# Patient Record
Sex: FEMALE | Race: WHITE | Employment: UNEMPLOYED | URBAN - NONMETROPOLITAN AREA
[De-identification: names, ages, dates, MRNs, and addresses within clinical notes are randomized per-mention and may not be internally consistent; named-entity substitution may affect disease eponyms.]

---

## 2017-02-28 ENCOUNTER — OFFICE VISIT (OUTPATIENT)
Dept: FAMILY MEDICINE CLINIC | Age: 57
End: 2017-02-28

## 2017-02-28 VITALS
WEIGHT: 137.8 LBS | BODY MASS INDEX: 27.78 KG/M2 | SYSTOLIC BLOOD PRESSURE: 100 MMHG | DIASTOLIC BLOOD PRESSURE: 68 MMHG | RESPIRATION RATE: 12 BRPM | HEIGHT: 59 IN | HEART RATE: 96 BPM

## 2017-02-28 DIAGNOSIS — Z82.62: ICD-10-CM

## 2017-02-28 DIAGNOSIS — M79.10 MUSCLE SORENESS: ICD-10-CM

## 2017-02-28 DIAGNOSIS — R20.0 FACIAL NUMBNESS: ICD-10-CM

## 2017-02-28 DIAGNOSIS — M40.292 OTHER KYPHOSIS OF CERVICAL REGION: ICD-10-CM

## 2017-02-28 DIAGNOSIS — R20.0 ARM NUMBNESS LEFT: Primary | ICD-10-CM

## 2017-02-28 DIAGNOSIS — Z78.0 POSTMENOPAUSAL: ICD-10-CM

## 2017-02-28 PROCEDURE — 3017F COLORECTAL CA SCREEN DOC REV: CPT | Performed by: FAMILY MEDICINE

## 2017-02-28 PROCEDURE — 99213 OFFICE O/P EST LOW 20 MIN: CPT | Performed by: FAMILY MEDICINE

## 2017-02-28 PROCEDURE — 1036F TOBACCO NON-USER: CPT | Performed by: FAMILY MEDICINE

## 2017-02-28 PROCEDURE — G8427 DOCREV CUR MEDS BY ELIG CLIN: HCPCS | Performed by: FAMILY MEDICINE

## 2017-02-28 PROCEDURE — G8419 CALC BMI OUT NRM PARAM NOF/U: HCPCS | Performed by: FAMILY MEDICINE

## 2017-02-28 PROCEDURE — 3014F SCREEN MAMMO DOC REV: CPT | Performed by: FAMILY MEDICINE

## 2017-02-28 PROCEDURE — G8484 FLU IMMUNIZE NO ADMIN: HCPCS | Performed by: FAMILY MEDICINE

## 2017-02-28 RX ORDER — MAGNESIUM 30 MG
30 TABLET ORAL 2 TIMES DAILY
COMMUNITY

## 2017-02-28 ASSESSMENT — PATIENT HEALTH QUESTIONNAIRE - PHQ9
SUM OF ALL RESPONSES TO PHQ QUESTIONS 1-9: 0
2. FEELING DOWN, DEPRESSED OR HOPELESS: 0
1. LITTLE INTEREST OR PLEASURE IN DOING THINGS: 0
SUM OF ALL RESPONSES TO PHQ9 QUESTIONS 1 & 2: 0

## 2017-03-01 ASSESSMENT — ENCOUNTER SYMPTOMS
DIARRHEA: 0
BLOOD IN STOOL: 0
NAUSEA: 0
BACK PAIN: 1
STRIDOR: 0
WHEEZING: 0
COUGH: 0
VOMITING: 0
ABDOMINAL PAIN: 0
SHORTNESS OF BREATH: 0
CONSTIPATION: 0

## 2017-03-03 ENCOUNTER — TELEPHONE (OUTPATIENT)
Dept: FAMILY MEDICINE CLINIC | Age: 57
End: 2017-03-03

## 2017-03-03 DIAGNOSIS — M81.0 OSTEOPOROSIS: Primary | ICD-10-CM

## 2017-03-03 RX ORDER — ALENDRONATE SODIUM 70 MG/1
70 TABLET ORAL
Qty: 4 TABLET | Refills: 11 | Status: SHIPPED | OUTPATIENT
Start: 2017-03-03 | End: 2019-03-04 | Stop reason: ALTCHOICE

## 2017-08-07 ENCOUNTER — HOSPITAL ENCOUNTER (OUTPATIENT)
Dept: MAMMOGRAPHY | Age: 57
Discharge: HOME | End: 2017-08-07
Payer: COMMERCIAL

## 2017-08-07 DIAGNOSIS — Z12.39 SCREENING BREAST EXAMINATION: ICD-10-CM

## 2017-08-07 PROCEDURE — G0202 SCR MAMMO BI INCL CAD: HCPCS

## 2018-03-01 ENCOUNTER — OFFICE VISIT (OUTPATIENT)
Dept: FAMILY MEDICINE CLINIC | Age: 58
End: 2018-03-01
Payer: COMMERCIAL

## 2018-03-01 VITALS
WEIGHT: 132.8 LBS | BODY MASS INDEX: 26.77 KG/M2 | SYSTOLIC BLOOD PRESSURE: 110 MMHG | DIASTOLIC BLOOD PRESSURE: 68 MMHG | HEART RATE: 88 BPM | HEIGHT: 59 IN | RESPIRATION RATE: 12 BRPM

## 2018-03-01 DIAGNOSIS — Z00.00 ROUTINE HEALTH MAINTENANCE: Primary | ICD-10-CM

## 2018-03-01 PROCEDURE — 99396 PREV VISIT EST AGE 40-64: CPT | Performed by: FAMILY MEDICINE

## 2018-03-01 ASSESSMENT — PATIENT HEALTH QUESTIONNAIRE - PHQ9
2. FEELING DOWN, DEPRESSED OR HOPELESS: 0
SUM OF ALL RESPONSES TO PHQ QUESTIONS 1-9: 0
SUM OF ALL RESPONSES TO PHQ9 QUESTIONS 1 & 2: 0
1. LITTLE INTEREST OR PLEASURE IN DOING THINGS: 0

## 2018-03-01 NOTE — PATIENT INSTRUCTIONS
risk for heart attack and stroke. · Blood pressure. Have your blood pressure checked during a routine doctor visit. Your doctor will tell you how often to check your blood pressure based on your age, your blood pressure results, and other factors. · Mammogram. Ask your doctor how often you should have a mammogram, which is an X-ray of your breasts. A mammogram can spot breast cancer before it can be felt and when it is easiest to treat. · Pap test and pelvic exam. Ask your doctor how often you should have a Pap test. You may not need to have a Pap test as often as you used to. · Vision. Have your eyes checked every year or two or as often as your doctor suggests. Some experts recommend that you have yearly exams for glaucoma and other age-related eye problems starting at age 48. · Hearing. Tell your doctor if you notice any change in your hearing. You can have tests to find out how well you hear. · Diabetes. Ask your doctor whether you should have tests for diabetes. · Colon cancer. You should begin tests for colon cancer at age 48. You may have one of several tests. Your doctor will tell you how often to have tests based on your age and risk. Risks include whether you already had a precancerous polyp removed from your colon or whether your parents, sisters and brothers, or children have had colon cancer. · Thyroid disease. Talk to your doctor about whether to have your thyroid checked as part of a regular physical exam. Women have an increased chance of a thyroid problem. · Osteoporosis. You should begin tests for bone density at age 72. If you are younger than 72, ask your doctor whether you have factors that may increase your risk for this disease. You may want to have this test before age 72. · Heart attack and stroke risk. At least every 4 to 6 years, you should have your risk for heart attack and stroke assessed.  Your doctor uses factors such as your age, blood pressure, cholesterol, and whether you jogging, stair climbing, and dancing are good choices. ¨ Do resistance exercises with weights or elastic bands 2 to 3 days a week. · Limit alcohol. Drink no more than 1 alcohol drink a day if you are a woman. Drink no more than 2 alcohol drinks a day if you are a man. · Do not smoke. Smoking can make bones thin faster. If you need help quitting, talk to your doctor about stop-smoking programs and medicines. These can increase your chances of quitting for good. When should you call for help? Watch closely for changes in your health, and be sure to contact your doctor if you have any problems. Where can you learn more? Go to https://[facility].Epoque. org and sign in to your [employer] account. Enter O028 in the WideOrbit box to learn more about \"Preventing Osteoporosis: Care Instructions. \"     If you do not have an account, please click on the \"Sign Up Now\" link. Current as of: May 12, 2017  Content Version: 11.5  © 9971-2551 Healthwise, Incorporated. Care instructions adapted under license by [facility] ([facility]). If you have questions about a medical condition or this instruction, always ask your healthcare professional. [address] any warranty or liability for your use of this information.

## 2018-03-01 NOTE — PROGRESS NOTES
Cherene Shone is a 61 y.o. female who presents today for:   Chief Complaint   Patient presents with    Annual Exam         HPI:     HPI   Yearly visit   Feeling well with no complaints  No vag bleeding/discharge    Still with occ numbness still in L arm. Worse when  neck out of place. Chiropractor and yoga has helped. occ GERD, worse with acidic foods. Resolves with eating apple and doesn't take meds for it. Walking more at local community center more recently. Due for colonoscopy-- [facility] number and she will call to set up an appt    Scheduled already for pap- Dr. Wilma Locke this year. Patient's medications, allergies, past medical, surgical, social, and family histories were reviewed and updated as appropriate. Outpatient Medications Prior to Visit   Medication Sig Dispense Refill    Multiple Vitamins-Minerals (ICAPS AREDS 2) CAPS Take by mouth      alendronate (FOSAMAX) 70 MG tablet Take 1 tablet by mouth every 7 days For osteoporosis 4 tablet 11    Cranberry 1000 MG CAPS Take by mouth      magnesium 30 MG tablet Take 30 mg by mouth 2 times daily       No facility-administered medications prior to visit. Subjective:      Review of Systems   Constitutional: Negative for activity change, appetite change, chills, diaphoresis, fatigue, fever and unexpected weight change. Respiratory: Negative for cough, shortness of breath, wheezing and stridor. Cardiovascular: Negative for chest pain, palpitations and leg swelling. Gastrointestinal: Negative for abdominal pain, blood in stool, constipation, diarrhea, nausea and vomiting. Neurological: Negative for headaches.        Objective:     Vitals:    03/01/18 1303   BP: 110/68   Site: Left Arm   Position: Sitting   Cuff Size: Medium Adult   Pulse: 88   Resp: 12   Weight: 132 lb 12.8 oz (60.2 kg)   Height: 4' 11.06\" (1.5 m)     Wt Readings from Last 3 Encounters:   03/01/18 132 lb 12.8 oz (60.2 kg)   02/28/17 137 lb

## 2018-03-02 ASSESSMENT — ENCOUNTER SYMPTOMS
ABDOMINAL PAIN: 0
DIARRHEA: 0
VOMITING: 0
STRIDOR: 0
CONSTIPATION: 0
COUGH: 0
SHORTNESS OF BREATH: 0
WHEEZING: 0
NAUSEA: 0
BLOOD IN STOOL: 0

## 2018-05-12 LAB
ALBUMIN SERPL-MCNC: 4.2 G/DL
ALP BLD-CCNC: 65 U/L
ALT SERPL-CCNC: 14 U/L
ANION GAP SERPL CALCULATED.3IONS-SCNC: 1.4 MMOL/L
AST SERPL-CCNC: 13 U/L
BASOPHILS ABSOLUTE: 0.1 /ΜL
BASOPHILS RELATIVE PERCENT: 0.7 %
BILIRUB SERPL-MCNC: 0.5 MG/DL (ref 0.1–1.4)
BUN BLDV-MCNC: 14 MG/DL
CALCIUM SERPL-MCNC: 9.1 MG/DL
CHLORIDE BLD-SCNC: 100 MMOL/L
CHOLESTEROL, TOTAL: 240 MG/DL
CHOLESTEROL/HDL RATIO: 2.7
CO2: 29 MMOL/L
CREAT SERPL-MCNC: 0.8 MG/DL
EOSINOPHILS ABSOLUTE: 0.1 /ΜL
EOSINOPHILS RELATIVE PERCENT: 1.7 %
GFR CALCULATED: 74
GLUCOSE BLD-MCNC: 76 MG/DL
HCT VFR BLD CALC: 42.1 % (ref 36–46)
HDLC SERPL-MCNC: 60 MG/DL (ref 35–70)
HEMOGLOBIN: 14.3 G/DL (ref 12–16)
LDL CHOLESTEROL CALCULATED: 164 MG/DL (ref 0–160)
LYMPHOCYTES ABSOLUTE: 2.2 /ΜL
LYMPHOCYTES RELATIVE PERCENT: 58 %
MCH RBC QN AUTO: 33.2 PG
MCHC RBC AUTO-ENTMCNC: 33.9 G/DL
MCV RBC AUTO: 97.9 FL
MONOCYTES ABSOLUTE: 0.5 /ΜL
MONOCYTES RELATIVE PERCENT: 12.6 %
NEUTROPHILS ABSOLUTE: 1 /ΜL
NEUTROPHILS RELATIVE PERCENT: 27 %
PDW BLD-RTO: 13.3 %
PHOSPHORUS: 3.7 MG/DL
PLATELET # BLD: 294 K/ΜL
PMV BLD AUTO: 10.3 FL
POTASSIUM SERPL-SCNC: 4.3 MMOL/L
RBC # BLD: 4.3 10^6/ΜL
SODIUM BLD-SCNC: 137 MMOL/L
TOTAL PROTEIN: 7.1
TRIGL SERPL-MCNC: 80 MG/DL
VLDLC SERPL CALC-MCNC: 16 MG/DL
WBC # BLD: 3.8 10^3/ML

## 2018-08-10 ENCOUNTER — HOSPITAL ENCOUNTER (OUTPATIENT)
Dept: MAMMOGRAPHY | Age: 58
Discharge: HOME | End: 2018-08-10
Payer: COMMERCIAL

## 2018-08-10 DIAGNOSIS — Z12.39 BREAST SCREENING: ICD-10-CM

## 2018-08-10 PROCEDURE — 77067 SCR MAMMO BI INCL CAD: CPT

## 2019-03-03 ENCOUNTER — TELEPHONE (OUTPATIENT)
Dept: FAMILY MEDICINE CLINIC | Age: 59
End: 2019-03-03

## 2019-03-04 ENCOUNTER — OFFICE VISIT (OUTPATIENT)
Dept: FAMILY MEDICINE CLINIC | Age: 59
End: 2019-03-04
Payer: COMMERCIAL

## 2019-03-04 VITALS
BODY MASS INDEX: 26.17 KG/M2 | TEMPERATURE: 96.7 F | HEART RATE: 84 BPM | HEIGHT: 59 IN | SYSTOLIC BLOOD PRESSURE: 118 MMHG | RESPIRATION RATE: 12 BRPM | DIASTOLIC BLOOD PRESSURE: 74 MMHG | WEIGHT: 129.8 LBS

## 2019-03-04 DIAGNOSIS — M40.292 OTHER KYPHOSIS OF CERVICAL REGION: ICD-10-CM

## 2019-03-04 DIAGNOSIS — M81.0 AGE-RELATED OSTEOPOROSIS WITHOUT CURRENT PATHOLOGICAL FRACTURE: ICD-10-CM

## 2019-03-04 DIAGNOSIS — S93.492A SPRAIN OF POSTERIOR TALOFIBULAR LIGAMENT OF LEFT ANKLE, INITIAL ENCOUNTER: ICD-10-CM

## 2019-03-04 DIAGNOSIS — Z78.0 POST-MENOPAUSAL: ICD-10-CM

## 2019-03-04 DIAGNOSIS — R14.0 GASSINESS: ICD-10-CM

## 2019-03-04 DIAGNOSIS — Z00.00 ROUTINE HEALTH MAINTENANCE: Primary | ICD-10-CM

## 2019-03-04 PROCEDURE — 1036F TOBACCO NON-USER: CPT | Performed by: FAMILY MEDICINE

## 2019-03-04 PROCEDURE — 3017F COLORECTAL CA SCREEN DOC REV: CPT | Performed by: FAMILY MEDICINE

## 2019-03-04 PROCEDURE — 99396 PREV VISIT EST AGE 40-64: CPT | Performed by: FAMILY MEDICINE

## 2019-03-04 PROCEDURE — G8484 FLU IMMUNIZE NO ADMIN: HCPCS | Performed by: FAMILY MEDICINE

## 2019-03-04 PROCEDURE — G8427 DOCREV CUR MEDS BY ELIG CLIN: HCPCS | Performed by: FAMILY MEDICINE

## 2019-03-04 PROCEDURE — G8419 CALC BMI OUT NRM PARAM NOF/U: HCPCS | Performed by: FAMILY MEDICINE

## 2019-03-04 PROCEDURE — 99212 OFFICE O/P EST SF 10 MIN: CPT | Performed by: FAMILY MEDICINE

## 2019-03-04 ASSESSMENT — PATIENT HEALTH QUESTIONNAIRE - PHQ9
2. FEELING DOWN, DEPRESSED OR HOPELESS: 0
SUM OF ALL RESPONSES TO PHQ9 QUESTIONS 1 & 2: 0
1. LITTLE INTEREST OR PLEASURE IN DOING THINGS: 0
SUM OF ALL RESPONSES TO PHQ QUESTIONS 1-9: 0
SUM OF ALL RESPONSES TO PHQ QUESTIONS 1-9: 0

## 2019-03-04 ASSESSMENT — ENCOUNTER SYMPTOMS
BLOOD IN STOOL: 0
COUGH: 0
VOMITING: 0
NAUSEA: 0
WHEEZING: 0
ABDOMINAL PAIN: 0
STRIDOR: 0
DIARRHEA: 0
SHORTNESS OF BREATH: 0
CONSTIPATION: 0

## 2019-03-06 ENCOUNTER — HOSPITAL ENCOUNTER (OUTPATIENT)
Dept: WOMENS IMAGING | Age: 59
Discharge: HOME | End: 2019-03-06
Payer: COMMERCIAL

## 2019-03-06 ENCOUNTER — TELEPHONE (OUTPATIENT)
Dept: FAMILY MEDICINE CLINIC | Age: 59
End: 2019-03-06

## 2019-03-06 DIAGNOSIS — Z78.0 POST-MENOPAUSAL: Primary | ICD-10-CM

## 2019-03-06 DIAGNOSIS — M40.292 OTHER KYPHOSIS OF CERVICAL REGION: ICD-10-CM

## 2019-03-06 DIAGNOSIS — M81.0 AGE-RELATED OSTEOPOROSIS WITHOUT CURRENT PATHOLOGICAL FRACTURE: ICD-10-CM

## 2019-03-06 DIAGNOSIS — M81.0 OSTEOPOROSIS: ICD-10-CM

## 2019-03-06 DIAGNOSIS — Z78.0 POST-MENOPAUSAL: ICD-10-CM

## 2019-03-06 PROCEDURE — 77080 DXA BONE DENSITY AXIAL: CPT

## 2019-03-07 RX ORDER — ALENDRONATE SODIUM 70 MG/1
70 TABLET ORAL
Qty: 12 TABLET | Refills: 3 | Status: SHIPPED | OUTPATIENT
Start: 2019-03-07

## 2019-05-03 ENCOUNTER — NURSE ONLY (OUTPATIENT)
Dept: LAB | Age: 59
End: 2019-05-03

## 2019-05-03 LAB
ALT SERPL-CCNC: 21 U/L (ref 11–66)
ANION GAP SERPL CALCULATED.3IONS-SCNC: 10 MEQ/L (ref 8–16)
AST SERPL-CCNC: 19 U/L (ref 5–40)
BUN BLDV-MCNC: 14 MG/DL (ref 7–22)
CALCIUM SERPL-MCNC: 9.2 MG/DL (ref 8.5–10.5)
CHLORIDE BLD-SCNC: 102 MEQ/L (ref 98–111)
CHOLESTEROL, TOTAL: 220 MG/DL (ref 100–199)
CO2: 28 MEQ/L (ref 23–33)
CREAT SERPL-MCNC: 0.7 MG/DL (ref 0.4–1.2)
ERYTHROCYTE [DISTWIDTH] IN BLOOD BY AUTOMATED COUNT: 13.4 % (ref 11.5–14.5)
ERYTHROCYTE [DISTWIDTH] IN BLOOD BY AUTOMATED COUNT: 49.1 FL (ref 35–45)
GFR SERPL CREATININE-BSD FRML MDRD: 86 ML/MIN/1.73M2
GLUCOSE BLD-MCNC: 85 MG/DL (ref 70–108)
HCT VFR BLD CALC: 42.3 % (ref 37–47)
HDLC SERPL-MCNC: 58 MG/DL
HEMOGLOBIN: 13.9 GM/DL (ref 12–16)
LDL CHOLESTEROL CALCULATED: 148 MG/DL
MCH RBC QN AUTO: 32.9 PG (ref 26–33)
MCHC RBC AUTO-ENTMCNC: 32.9 GM/DL (ref 32.2–35.5)
MCV RBC AUTO: 100.2 FL (ref 81–99)
PLATELET # BLD: 291 THOU/MM3 (ref 130–400)
PMV BLD AUTO: 11.7 FL (ref 9.4–12.4)
POTASSIUM SERPL-SCNC: 4.5 MEQ/L (ref 3.5–5.2)
RBC # BLD: 4.22 MILL/MM3 (ref 4.2–5.4)
SODIUM BLD-SCNC: 140 MEQ/L (ref 135–145)
TRIGL SERPL-MCNC: 72 MG/DL (ref 0–199)
WBC # BLD: 4 THOU/MM3 (ref 4.8–10.8)

## 2019-08-12 ENCOUNTER — HOSPITAL ENCOUNTER (OUTPATIENT)
Dept: MAMMOGRAPHY | Age: 59
Discharge: HOME | End: 2019-08-12
Payer: COMMERCIAL

## 2019-08-12 DIAGNOSIS — Z12.39 SCREENING BREAST EXAMINATION: ICD-10-CM

## 2019-08-12 PROCEDURE — 77063 BREAST TOMOSYNTHESIS BI: CPT

## 2019-10-12 LAB
AVERAGE GLUCOSE: 103
HBA1C MFR BLD: 5.2 %

## 2020-09-17 ENCOUNTER — HOSPITAL ENCOUNTER (OUTPATIENT)
Dept: MAMMOGRAPHY | Age: 60
Discharge: HOME | End: 2020-09-17
Payer: COMMERCIAL

## 2020-09-17 PROCEDURE — 77063 BREAST TOMOSYNTHESIS BI: CPT

## 2021-09-15 ENCOUNTER — NURSE ONLY (OUTPATIENT)
Dept: LAB | Age: 61
End: 2021-09-15

## 2021-09-16 ENCOUNTER — TELEPHONE (OUTPATIENT)
Dept: FAMILY MEDICINE CLINIC | Age: 61
End: 2021-09-16

## 2021-09-16 NOTE — TELEPHONE ENCOUNTER
----- Message from Blanquita Beltran sent at 9/16/2021  1:09 PM EDT -----  Subject: Message to Provider    QUESTIONS  Information for Provider? Patient wondering who is taking her on as a new   patient in the office now due to PCP leaving; wanted to know who to send   blood work orders as well. Also wanted information for daughter, who was   also a patient, should go to to note about wisdom teeth coming soon. If   the office would like, a text would be fine as well.   ---------------------------------------------------------------------------  --------------  CALL BACK INFO  What is the best way for the office to contact you? OK to leave message on   voicemail  Preferred Call Back Phone Number? [phone]  ---------------------------------------------------------------------------  --------------  SCRIPT ANSWERS  Relationship to Patient?  Self

## 2021-09-27 LAB — CYTOLOGY THIN PREP PAP: NORMAL

## 2021-10-05 ENCOUNTER — HOSPITAL ENCOUNTER (OUTPATIENT)
Dept: MAMMOGRAPHY | Age: 61
Discharge: HOME | End: 2021-10-05
Payer: COMMERCIAL

## 2021-10-05 DIAGNOSIS — Z12.39 SCREENING BREAST EXAMINATION: ICD-10-CM

## 2021-10-05 PROCEDURE — 77063 BREAST TOMOSYNTHESIS BI: CPT

## 2021-10-09 LAB
ALBUMIN SERPL-MCNC: 4.4 G/DL
ALP BLD-CCNC: 63 U/L
ALT SERPL-CCNC: 12 U/L
ANION GAP SERPL CALCULATED.3IONS-SCNC: NORMAL MMOL/L
AST SERPL-CCNC: 15 U/L
BASOPHILS ABSOLUTE: NORMAL
BASOPHILS RELATIVE PERCENT: 1.1 %
BILIRUB SERPL-MCNC: 0.4 MG/DL (ref 0.1–1.4)
BUN BLDV-MCNC: 19 MG/DL
CALCIUM SERPL-MCNC: 9.2 MG/DL
CHLORIDE BLD-SCNC: 101 MMOL/L
CHOLESTEROL, TOTAL: 239 MG/DL
CHOLESTEROL/HDL RATIO: 2.4
CO2: 29 MMOL/L
CREAT SERPL-MCNC: 0.8 MG/DL
EOSINOPHILS ABSOLUTE: 0.1 /ΜL
EOSINOPHILS RELATIVE PERCENT: 3 %
GFR CALCULATED: 73
GLUCOSE BLD-MCNC: 73 MG/DL
HCT VFR BLD CALC: 42 % (ref 36–46)
HDLC SERPL-MCNC: 67 MG/DL (ref 35–70)
HEMOGLOBIN: 13.8 G/DL (ref 12–16)
LDL CHOLESTEROL CALCULATED: 160 MG/DL (ref 0–160)
LYMPHOCYTES ABSOLUTE: 1.9 /ΜL
LYMPHOCYTES RELATIVE PERCENT: 46.9 %
MCH RBC QN AUTO: 32.7 PG
MCHC RBC AUTO-ENTMCNC: 32.9 G/DL
MCV RBC AUTO: 99.4 FL
MONOCYTES ABSOLUTE: 1.5 /ΜL
MONOCYTES RELATIVE PERCENT: 12.7 %
NEUTROPHILS ABSOLUTE: 1.5 /ΜL
NEUTROPHILS RELATIVE PERCENT: 36.3 %
NONHDLC SERPL-MCNC: NORMAL MG/DL
PDW BLD-RTO: 13.8 %
PLATELET # BLD: 252 K/ΜL
PMV BLD AUTO: 10.1 FL
POTASSIUM SERPL-SCNC: 4.1 MMOL/L
RBC # BLD: 4.22 10^6/ΜL
SODIUM BLD-SCNC: 137 MMOL/L
TOTAL PROTEIN: 6.9
TRIGL SERPL-MCNC: 58 MG/DL
VLDLC SERPL CALC-MCNC: 12 MG/DL
WBC # BLD: 4.1 10^3/ML

## 2022-01-31 ENCOUNTER — OFFICE VISIT (OUTPATIENT)
Dept: FAMILY MEDICINE CLINIC | Age: 62
End: 2022-01-31
Payer: COMMERCIAL

## 2022-01-31 VITALS
BODY MASS INDEX: 23.18 KG/M2 | HEART RATE: 89 BPM | WEIGHT: 115 LBS | RESPIRATION RATE: 14 BRPM | SYSTOLIC BLOOD PRESSURE: 118 MMHG | TEMPERATURE: 97.6 F | DIASTOLIC BLOOD PRESSURE: 72 MMHG

## 2022-01-31 DIAGNOSIS — H01.006 BLEPHARITIS OF BOTH EYES, UNSPECIFIED EYELID, UNSPECIFIED TYPE: Primary | ICD-10-CM

## 2022-01-31 DIAGNOSIS — H01.003 BLEPHARITIS OF BOTH EYES, UNSPECIFIED EYELID, UNSPECIFIED TYPE: Primary | ICD-10-CM

## 2022-01-31 PROCEDURE — 99213 OFFICE O/P EST LOW 20 MIN: CPT | Performed by: NURSE PRACTITIONER

## 2022-01-31 PROCEDURE — 96372 THER/PROPH/DIAG INJ SC/IM: CPT | Performed by: NURSE PRACTITIONER

## 2022-01-31 RX ORDER — TRIAMCINOLONE ACETONIDE 40 MG/ML
40 INJECTION, SUSPENSION INTRA-ARTICULAR; INTRAMUSCULAR ONCE
Status: COMPLETED | OUTPATIENT
Start: 2022-01-31 | End: 2022-01-31

## 2022-01-31 RX ADMIN — TRIAMCINOLONE ACETONIDE 40 MG: 40 INJECTION, SUSPENSION INTRA-ARTICULAR; INTRAMUSCULAR at 11:37

## 2022-01-31 SDOH — ECONOMIC STABILITY: FOOD INSECURITY: WITHIN THE PAST 12 MONTHS, THE FOOD YOU BOUGHT JUST DIDN'T LAST AND YOU DIDN'T HAVE MONEY TO GET MORE.: NEVER TRUE

## 2022-01-31 SDOH — ECONOMIC STABILITY: FOOD INSECURITY: WITHIN THE PAST 12 MONTHS, YOU WORRIED THAT YOUR FOOD WOULD RUN OUT BEFORE YOU GOT MONEY TO BUY MORE.: NEVER TRUE

## 2022-01-31 ASSESSMENT — SOCIAL DETERMINANTS OF HEALTH (SDOH): HOW HARD IS IT FOR YOU TO PAY FOR THE VERY BASICS LIKE FOOD, HOUSING, MEDICAL CARE, AND HEATING?: NOT HARD AT ALL

## 2022-01-31 ASSESSMENT — PATIENT HEALTH QUESTIONNAIRE - PHQ9
2. FEELING DOWN, DEPRESSED OR HOPELESS: 0
SUM OF ALL RESPONSES TO PHQ QUESTIONS 1-9: 0
1. LITTLE INTEREST OR PLEASURE IN DOING THINGS: 0
SUM OF ALL RESPONSES TO PHQ9 QUESTIONS 1 & 2: 0

## 2022-01-31 ASSESSMENT — ENCOUNTER SYMPTOMS
RESPIRATORY NEGATIVE: 1
GASTROINTESTINAL NEGATIVE: 1
EYES NEGATIVE: 1

## 2022-01-31 NOTE — PROGRESS NOTES
Administrations This Visit     triamcinolone acetonide (KENALOG-40) injection 40 mg     Admin Date  01/31/2022  11:37 Action  Given Dose  40 mg Route  IntraMUSCular Site  Dorsogluteal Right Administered By  Mary Leonard CMA ([facility])    Ordering Provider: JUHI Lockett CNP    NDC: 8160-6304-67    Lot#: [id number]    : [facility] (PRIMARY CARE)    Patient Supplied?: No                Patient instructed to remain in clinic for 20 minutes after injection and was advised to report any adverse reaction to me immediately.

## 2022-01-31 NOTE — PROGRESS NOTES
Allen Eaton is a 57 y.o. female whopresents today for :  Chief Complaint   Patient presents with    Rash     eyes and neck        HPI:     HPI  Pt has itchy eyelids for 4months and area on left neck that is inflamed. Patient Active Problem List   Diagnosis    Macrocytosis without anemia    Increased risk of breast cancer    Osteoporosis      History reviewed. No pertinent past medical history. Past Surgical History:   Procedure Laterality Date    CERVIX SURGERY  age 26    \"something treated with a laser\"     SECTION  age 39    TONSILLECTOMY  age 8     Family History   Problem Relation Age of Onset    Osteoporosis Maternal Grandmother     Osteoporosis Paternal Grandmother     Stroke Mother 28        unknown etiology    Prostate Cancer Father     Colon Polyps Father     Heart Disease Father 72    Heart Disease Other         mult members    Breast Cancer Paternal Cousin 46    Breast Cancer Maternal Aunt 76     Social History     Tobacco Use    Smoking status: Never Smoker    Smokeless tobacco: Never Used   Substance Use Topics    Alcohol use: No      Current Outpatient Medications   Medication Sig Dispense Refill    VITAMIN D PO Take by mouth      Menaquinone-7 (VITAMIN K2 PO) Take by mouth      COENZYME Q10 PO Take by mouth      alendronate (FOSAMAX) 70 MG tablet Take 1 tablet by mouth every 7 days For osteoporosis (Patient not taking: Reported on 2022) 12 tablet 3    calcium citrate-vitamin d (CALCIUM CITRATE + D) 250-200 MG-UNIT TABS Take 1 tablet by mouth daily (Patient not taking: Reported on 2022) 90 tablet 3    NONFORMULARY Daily Indications: bone rescue (Patient not taking: Reported on 2022)      magnesium 30 MG tablet Take 30 mg by mouth 2 times daily       No current facility-administered medications for this visit.      Allergies   Allergen Reactions    Amoxicillin Rash     Health Maintenance   Topic Date Due    Shingles Vaccine (1 of 2) Never done    Depression Screen  01/31/2023    Breast cancer screen  10/05/2023    Lipid screen  05/03/2024    Cervical cancer screen  09/15/2024    Colon cancer screen colonoscopy  06/05/2025    DTaP/Tdap/Td vaccine (4 - Td or Tdap) 06/30/2025    DEXA (modify frequency per FRAX score)  Completed    Flu vaccine  Completed    COVID-19 Vaccine  Completed    Hepatitis C screen  Completed    HIV screen  Completed    Hepatitis A vaccine  Aged Out    Hepatitis B vaccine  Aged Out    Hib vaccine  Aged Out    Meningococcal (ACWY) vaccine  Aged Out    Pneumococcal 0-64 years Vaccine  Aged Out       Subjective:     Review of Systems   Constitutional: Negative. HENT: Negative. Eyes: Negative. Respiratory: Negative. Cardiovascular: Negative. Gastrointestinal: Negative. Musculoskeletal: Negative. Skin: Positive for rash. Neurological: Negative. Objective:     Vitals:    01/31/22 1028   BP: 118/72   Site: Left Upper Arm   Position: Sitting   Cuff Size: Medium Adult   Pulse: 89   Resp: 14   Temp: 97.6 °F (36.4 °C)   TempSrc: Temporal   Weight: 115 lb (52.2 kg)       Physical Exam  Constitutional:       Appearance: She is well-developed. HENT:      Head: Normocephalic. Right Ear: Tympanic membrane and external ear normal.      Left Ear: Tympanic membrane and external ear normal.      Nose: Nose normal.   Cardiovascular:      Rate and Rhythm: Normal rate and regular rhythm. Heart sounds: Normal heart sounds. No murmur heard. No friction rub. No gallop. Pulmonary:      Effort: Pulmonary effort is normal.      Breath sounds: Normal breath sounds. No wheezing or rales. Abdominal:      General: Bowel sounds are normal.      Palpations: Abdomen is soft. Tenderness: There is no abdominal tenderness. There is no guarding. Musculoskeletal:         General: Normal range of motion. Cervical back: Normal range of motion and neck supple.    Lymphadenopathy:      Cervical: No cervical adenopathy. Skin:     General: Skin is warm. Neurological:      Mental Status: She is alert and oriented to person, place, and time. Deep Tendon Reflexes: Reflexes are normal and symmetric. Assessment:      Diagnosis Orders   1. Blepharitis of both eyes, unspecified eyelid, unspecified type  triamcinolone acetonide (KENALOG-40) injection 40 mg       Plan:      No follow-ups on file. No orders of the defined types were placed in this encounter. Orders Placed This Encounter   Medications    triamcinolone acetonide (KENALOG-40) injection 40 mg      See orders. Baby shampoo wipes, ok to hydrocortisone cream     Patient given educational materials - seepatient instructions. Discussed use, benefit, and side effects of prescribed medications. All patient questions answered. Pt voiced understanding. Patient agreed withtreatment plan. Follow up as directed.      Electronically signed by JUHI Bishop CNP on 1/31/2022 at 5:39 PM

## 2022-05-14 LAB
ALBUMIN SERPL-MCNC: 4.1 G/DL
ALP BLD-CCNC: 54 U/L
ALT SERPL-CCNC: 12 U/L
ANION GAP SERPL CALCULATED.3IONS-SCNC: 1.3 MMOL/L
AST SERPL-CCNC: 14 U/L
BASOPHILS ABSOLUTE: NORMAL
BASOPHILS RELATIVE PERCENT: 1 %
BILIRUB SERPL-MCNC: 0.4 MG/DL (ref 0.1–1.4)
BUN BLDV-MCNC: 22 MG/DL
CALCIUM SERPL-MCNC: 9.4 MG/DL
CHLORIDE BLD-SCNC: 102 MMOL/L
CHOLESTEROL, TOTAL: 262 MG/DL
CHOLESTEROL/HDL RATIO: ABNORMAL
CO2: 27 MMOL/L
CREAT SERPL-MCNC: 0.9 MG/DL
EOSINOPHILS ABSOLUTE: 0.1 /ΜL
EOSINOPHILS RELATIVE PERCENT: 1.5 %
GFR CALCULATED: 63
GLUCOSE BLD-MCNC: 79 MG/DL
HCT VFR BLD CALC: 43 % (ref 36–46)
HDLC SERPL-MCNC: 72 MG/DL (ref 35–70)
HEMOGLOBIN: 14.1 G/DL (ref 12–16)
LDL CHOLESTEROL CALCULATED: 179 MG/DL (ref 0–160)
LYMPHOCYTES ABSOLUTE: 1.8 /ΜL
LYMPHOCYTES RELATIVE PERCENT: 47.9 %
MCH RBC QN AUTO: 32.8 PG
MCHC RBC AUTO-ENTMCNC: 32.9 G/DL
MCV RBC AUTO: 99.8 FL
MONOCYTES ABSOLUTE: 0.4 /ΜL
MONOCYTES RELATIVE PERCENT: 11.7 %
NEUTROPHILS ABSOLUTE: 1.5 /ΜL
NEUTROPHILS RELATIVE PERCENT: 37.9 %
NONHDLC SERPL-MCNC: ABNORMAL MG/DL
PHOSPHORUS: 3.4 MG/DL
PLATELET # BLD: 260 K/ΜL
PMV BLD AUTO: 9.7 FL
POTASSIUM SERPL-SCNC: 4.5 MMOL/L
RBC # BLD: 4.31 10^6/ΜL
SODIUM BLD-SCNC: 135 MMOL/L
TOTAL PROTEIN: 7.2
TRIGL SERPL-MCNC: 56 MG/DL
VLDLC SERPL CALC-MCNC: 11 MG/DL
WBC # BLD: 3.8 10^3/ML

## 2022-06-15 ENCOUNTER — OFFICE VISIT (OUTPATIENT)
Dept: FAMILY MEDICINE CLINIC | Age: 62
End: 2022-06-15
Payer: COMMERCIAL

## 2022-06-15 ENCOUNTER — TELEPHONE (OUTPATIENT)
Dept: FAMILY MEDICINE CLINIC | Age: 62
End: 2022-06-15

## 2022-06-15 VITALS
HEIGHT: 59 IN | RESPIRATION RATE: 17 BRPM | DIASTOLIC BLOOD PRESSURE: 65 MMHG | TEMPERATURE: 98.9 F | OXYGEN SATURATION: 93 % | WEIGHT: 112 LBS | BODY MASS INDEX: 22.58 KG/M2 | SYSTOLIC BLOOD PRESSURE: 112 MMHG | HEART RATE: 94 BPM

## 2022-06-15 DIAGNOSIS — L21.9 SEBORRHEIC DERMATITIS: Primary | ICD-10-CM

## 2022-06-15 PROCEDURE — 99213 OFFICE O/P EST LOW 20 MIN: CPT | Performed by: NURSE PRACTITIONER

## 2022-06-15 RX ORDER — PREDNISONE 1 MG/1
TABLET ORAL
Qty: 45 TABLET | Refills: 0 | Status: SHIPPED | OUTPATIENT
Start: 2022-06-15 | End: 2022-06-25

## 2022-06-15 RX ORDER — ATORVASTATIN CALCIUM 20 MG/1
20 TABLET, FILM COATED ORAL DAILY
Qty: 30 TABLET | Refills: 11 | Status: SHIPPED | OUTPATIENT
Start: 2022-06-15 | End: 2022-06-16

## 2022-06-15 ASSESSMENT — ENCOUNTER SYMPTOMS
EYES NEGATIVE: 1
RESPIRATORY NEGATIVE: 1
GASTROINTESTINAL NEGATIVE: 1

## 2022-06-15 NOTE — PROGRESS NOTES
Spencer Thompson is a 57 y.o. female whopresents today for :  Chief Complaint   Patient presents with    Rash     dermatology referral        HPI:     HPI  Pt here with cont rash. We had seen her in January with red dry skin on eyes and neck. Received shot of kenalog and helped for some time but now back and worse. Around eyes, nose, neck and on sides      Patient Active Problem List   Diagnosis    Macrocytosis without anemia    Increased risk of breast cancer    Osteoporosis      History reviewed. No pertinent past medical history. Past Surgical History:   Procedure Laterality Date    CERVIX SURGERY  age 26    \"something treated with a laser\"     SECTION  age 39    TONSILLECTOMY  age 8     Family History   Problem Relation Age of Onset    Osteoporosis Maternal Grandmother     Osteoporosis Paternal Grandmother     Stroke Mother 28        unknown etiology    Prostate Cancer Father     Colon Polyps Father     Heart Disease Father 72    Heart Disease Other         mult members    Breast Cancer Paternal Cousin 46    Breast Cancer Maternal Aunt 76     Social History     Tobacco Use    Smoking status: Never Smoker    Smokeless tobacco: Never Used   Substance Use Topics    Alcohol use: No      Current Outpatient Medications   Medication Sig Dispense Refill    NONFORMULARY preservision      predniSONE (DELTASONE) 5 MG tablet 9 tabs po day 1 taper by 5mg daily 45 tablet 0    fluocinonide (LIDEX) 0.05 % cream Apply topically 2 times daily.  60 g 0    VITAMIN D PO Take by mouth      Menaquinone-7 (VITAMIN K2 PO) Take by mouth      COENZYME Q10 PO Take by mouth      magnesium 30 MG tablet Take 30 mg by mouth 2 times daily      alendronate (FOSAMAX) 70 MG tablet Take 1 tablet by mouth every 7 days For osteoporosis (Patient not taking: Reported on 2022) 12 tablet 3    calcium citrate-vitamin d (CALCIUM CITRATE + D) 250-200 MG-UNIT TABS Take 1 tablet by mouth daily (Patient not taking: Reported on 1/31/2022) 90 tablet 3    NONFORMULARY Daily Indications: bone rescue (Patient not taking: Reported on 1/31/2022)       No current facility-administered medications for this visit. Allergies   Allergen Reactions    Amoxicillin Rash     Health Maintenance   Topic Date Due    Shingles vaccine (1 of 2) Never done    Depression Screen  01/31/2023    Breast cancer screen  10/05/2023    Cervical cancer screen  09/15/2024    Colorectal Cancer Screen  06/05/2025    DTaP/Tdap/Td vaccine (4 - Td or Tdap) 06/30/2025    Lipids  05/14/2027    DEXA (modify frequency per FRAX score)  Completed    Flu vaccine  Completed    COVID-19 Vaccine  Completed    Hepatitis C screen  Completed    HIV screen  Completed    Hepatitis A vaccine  Aged Out    Hepatitis B vaccine  Aged Out    Hib vaccine  Aged Out    Meningococcal (ACWY) vaccine  Aged Out    Pneumococcal 0-64 years Vaccine  Aged Out       Subjective:     Review of Systems   Constitutional: Negative. HENT: Negative. Eyes: Negative. Respiratory: Negative. Cardiovascular: Negative. Gastrointestinal: Negative. Musculoskeletal: Negative. Skin: Positive for rash. Neurological: Negative. Objective:     Vitals:    06/15/22 1536   BP: 112/65   Site: Left Upper Arm   Position: Sitting   Cuff Size: Medium Adult   Pulse: 94   Resp: 17   Temp: 98.9 °F (37.2 °C)   TempSrc: Temporal   SpO2: 93%   Weight: 112 lb (50.8 kg)   Height: 4' 11.06\" (1.5 m)       Physical Exam  Constitutional:       Appearance: She is well-developed. HENT:      Head: Normocephalic. Right Ear: Tympanic membrane and external ear normal.      Left Ear: Tympanic membrane and external ear normal.      Nose: Nose normal.   Cardiovascular:      Rate and Rhythm: Normal rate and regular rhythm. Heart sounds: Normal heart sounds. No murmur heard. No friction rub. No gallop.     Pulmonary:      Effort: Pulmonary effort is normal.      Breath sounds: Normal breath sounds. No wheezing or rales. Abdominal:      General: Bowel sounds are normal.      Palpations: Abdomen is soft. Tenderness: There is no abdominal tenderness. There is no guarding. Musculoskeletal:         General: Normal range of motion. Cervical back: Normal range of motion and neck supple. Lymphadenopathy:      Cervical: No cervical adenopathy. Skin:     General: Skin is warm. Neurological:      Mental Status: She is alert and oriented to person, place, and time. Deep Tendon Reflexes: Reflexes are normal and symmetric. Assessment:      Diagnosis Orders   1. Seborrheic dermatitis  predniSONE (DELTASONE) 5 MG tablet    fluocinonide (LIDEX) 0.05 % cream    [facility] - Vandana Becerra MD, Dermatology, [facility].[place]       Plan:      No follow-ups on file. Orders Placed This Encounter   Procedures   Yash Martínez MD, Dermatology, [facility].[place]     Referral Priority:   Routine     Referral Type:   Eval and Treat     Referral Reason:   Specialty Services Required     Referred to Provider:   Romana Wood MD     Requested Specialty:   Dermatology     Number of Visits Requested:   1     Orders Placed This Encounter   Medications    predniSONE (DELTASONE) 5 MG tablet     Si tabs po day 1 taper by 5mg daily     Dispense:  45 tablet     Refill:  0    fluocinonide (LIDEX) 0.05 % cream     Sig: Apply topically 2 times daily. Dispense:  60 g     Refill:  0      See orders  Refer to derm    Patient given educational materials - seepatient instructions. Discussed use, benefit, and side effects of prescribed medications. All patient questions answered. Pt voiced understanding. Patient agreed withtreatment plan. Follow up as directed.      Electronically signed by [facility] Hospital[facility]UHI CNP on 6/15/2022 at 5:50 PM

## 2022-06-15 NOTE — TELEPHONE ENCOUNTER
Please call pt. [facility] labs reviewed. Cholesterol is quite high.   Recommend to start lipitor daily

## 2022-06-16 RX ORDER — EZETIMIBE 10 MG/1
10 TABLET ORAL DAILY
Qty: 90 TABLET | Refills: 1 | Status: SHIPPED | OUTPATIENT
Start: 2022-06-16

## 2022-06-16 NOTE — TELEPHONE ENCOUNTER
[id number] Jessica Garcia, is she willing to try zetia.   Is a cholesterol medication that is not a statin

## 2022-06-16 NOTE — TELEPHONE ENCOUNTER
Pt states that she can not take statins, has a family history of reactions and does not want to risk possible side effects.

## 2022-10-12 ENCOUNTER — HOSPITAL ENCOUNTER (OUTPATIENT)
Dept: MAMMOGRAPHY | Age: 62
Discharge: HOME | End: 2022-10-12
Payer: COMMERCIAL

## 2022-10-12 DIAGNOSIS — Z12.39 SCREENING BREAST EXAMINATION: ICD-10-CM

## 2022-10-12 PROCEDURE — 77063 BREAST TOMOSYNTHESIS BI: CPT

## 2023-10-16 ENCOUNTER — HOSPITAL ENCOUNTER (OUTPATIENT)
Dept: MAMMOGRAPHY | Age: 63
Discharge: HOME | End: 2023-10-16
Payer: COMMERCIAL

## 2023-10-16 VITALS — WEIGHT: 115 LBS | BODY MASS INDEX: 24.14 KG/M2 | HEIGHT: 58 IN

## 2023-10-16 DIAGNOSIS — Z12.39 SCREENING BREAST EXAMINATION: ICD-10-CM

## 2023-10-16 PROCEDURE — 77063 BREAST TOMOSYNTHESIS BI: CPT

## 2024-09-27 ENCOUNTER — LAB (OUTPATIENT)
Dept: LAB | Age: 64
End: 2024-09-27

## 2024-10-08 LAB — CYTOLOGY THIN PREP PAP: NORMAL

## 2024-10-12 LAB
BASOPHILS ABSOLUTE: 0.1 /ΜL
BASOPHILS RELATIVE PERCENT: 1.3 %
BILIRUBIN DIRECT: 0.1 MG/DL
CHOLESTEROL, TOTAL: 297 MG/DL
CHOLESTEROL/HDL RATIO: 3
EOSINOPHILS ABSOLUTE: 0.1 /ΜL
EOSINOPHILS RELATIVE PERCENT: 2.2 %
ESTIMATED AVERAGE GLUCOSE: 103
HBA1C MFR BLD: 5.2 %
HCT VFR BLD CALC: 42.1 % (ref 36–46)
HDLC SERPL-MCNC: 70 MG/DL (ref 35–70)
HEMOGLOBIN: 14.1 G/DL (ref 12–16)
LDL CHOLESTEROL: 211
LYMPHOCYTES ABSOLUTE: 2.7 /ΜL
LYMPHOCYTES RELATIVE PERCENT: 59.9 %
MCH RBC QN AUTO: 33.1 PG
MCHC RBC AUTO-ENTMCNC: 33.5 G/DL
MCV RBC AUTO: 98.8 FL
MONOCYTES ABSOLUTE: 0.5 /ΜL
MONOCYTES RELATIVE PERCENT: 12 %
NEUTROPHILS ABSOLUTE: 1.1 /ΜL
NEUTROPHILS RELATIVE PERCENT: 24.6 %
NONHDLC SERPL-MCNC: NORMAL MG/DL
PHOSPHORUS: 4 MG/DL
PLATELET # BLD: 270 K/ΜL
PMV BLD AUTO: 10 FL
RBC # BLD: 4.26 10^6/ΜL
TRIGL SERPL-MCNC: 79 MG/DL
VLDLC SERPL CALC-MCNC: 16 MG/DL
WBC # BLD: 4.5 10^3/ML

## 2024-10-21 ENCOUNTER — HOSPITAL ENCOUNTER (OUTPATIENT)
Dept: MAMMOGRAPHY | Age: 64
Discharge: HOME | End: 2024-10-21
Payer: COMMERCIAL

## 2024-10-21 VITALS — WEIGHT: 105 LBS | HEIGHT: 58 IN | BODY MASS INDEX: 22.04 KG/M2

## 2024-10-21 DIAGNOSIS — Z12.39 BREAST SCREENING: ICD-10-CM

## 2024-10-21 PROCEDURE — 77063 BREAST TOMOSYNTHESIS BI: CPT

## 2025-05-17 LAB
ALBUMIN: 4.2 G/DL
ALP BLD-CCNC: 66 U/L
ALT SERPL-CCNC: 10 U/L
ANION GAP SERPL CALCULATED.3IONS-SCNC: NORMAL MMOL/L
AST SERPL-CCNC: 14 U/L
BASOPHILS ABSOLUTE: ABNORMAL
BASOPHILS RELATIVE PERCENT: 0.9 %
BILIRUB SERPL-MCNC: 0.4 MG/DL (ref 0.1–1.4)
BILIRUBIN DIRECT: 3.6 MG/DL
BUN BLDV-MCNC: 21 MG/DL
CALCIUM SERPL-MCNC: 9.7 MG/DL
CHLORIDE BLD-SCNC: 101 MMOL/L
CHOLESTEROL, TOTAL: 288 MG/DL
CHOLESTEROL/HDL RATIO: 2.7
CO2: 31 MMOL/L
CREAT SERPL-MCNC: 0.7 MG/DL
EOSINOPHILS ABSOLUTE: 0.1 /ΜL
EOSINOPHILS RELATIVE PERCENT: 3.3 %
ESTIMATED AVERAGE GLUCOSE: 105
GFR, ESTIMATED: 84
GLUCOSE BLD-MCNC: 84 MG/DL
HBA1C MFR BLD: 5.3 %
HCT VFR BLD CALC: 41.9 % (ref 36–46)
HDLC SERPL-MCNC: 74 MG/DL (ref 35–70)
HEMOGLOBIN: 18.8 G/DL (ref 12–16)
LDL CHOLESTEROL: 200
LYMPHOCYTES ABSOLUTE: 0.9 /ΜL
LYMPHOCYTES RELATIVE PERCENT: 42.9 %
MCH RBC QN AUTO: 32.9 PG
MCHC RBC AUTO-ENTMCNC: 32.9 G/DL
MCV RBC AUTO: 99.9 FL
MONOCYTES ABSOLUTE: 0.6 /ΜL
MONOCYTES RELATIVE PERCENT: 12.8 %
NEUTROPHILS ABSOLUTE: 1.8 /ΜL
NEUTROPHILS RELATIVE PERCENT: 40.1 %
NONHDLC SERPL-MCNC: ABNORMAL MG/DL
PDW BLD-RTO: 13.9 %
PHOSPHORUS: 14 MG/DL
PLATELET # BLD: 249 K/ΜL
PMV BLD AUTO: 9.9 FL
POTASSIUM SERPL-SCNC: 4.5 MMOL/L
RBC # BLD: 4.19 10^6/ΜL
SODIUM BLD-SCNC: 136 MMOL/L
TOTAL PROTEIN: 7.4 G/DL (ref 6.4–8.2)
TRIGL SERPL-MCNC: 68 MG/DL
VLDLC SERPL CALC-MCNC: 14 MG/DL
WBC # BLD: 4.5 10^3/ML

## 2025-06-12 ASSESSMENT — PATIENT HEALTH QUESTIONNAIRE - PHQ9
1. LITTLE INTEREST OR PLEASURE IN DOING THINGS: NOT AT ALL
SUM OF ALL RESPONSES TO PHQ QUESTIONS 1-9: 0
SUM OF ALL RESPONSES TO PHQ QUESTIONS 1-9: 0
2. FEELING DOWN, DEPRESSED OR HOPELESS: NOT AT ALL
SUM OF ALL RESPONSES TO PHQ QUESTIONS 1-9: 0
SUM OF ALL RESPONSES TO PHQ9 QUESTIONS 1 & 2: 0
SUM OF ALL RESPONSES TO PHQ QUESTIONS 1-9: 0
2. FEELING DOWN, DEPRESSED OR HOPELESS: NOT AT ALL
1. LITTLE INTEREST OR PLEASURE IN DOING THINGS: NOT AT ALL

## 2025-06-18 ENCOUNTER — OFFICE VISIT (OUTPATIENT)
Dept: FAMILY MEDICINE CLINIC | Age: 65
End: 2025-06-18
Payer: MEDICARE

## 2025-06-18 VITALS
OXYGEN SATURATION: 99 % | RESPIRATION RATE: 16 BRPM | WEIGHT: 106.2 LBS | HEIGHT: 58 IN | SYSTOLIC BLOOD PRESSURE: 110 MMHG | HEART RATE: 87 BPM | TEMPERATURE: 98.2 F | DIASTOLIC BLOOD PRESSURE: 62 MMHG | BODY MASS INDEX: 22.29 KG/M2

## 2025-06-18 DIAGNOSIS — M54.12 CERVICAL RADICULOPATHY: ICD-10-CM

## 2025-06-18 DIAGNOSIS — E78.5 HYPERLIPIDEMIA, UNSPECIFIED HYPERLIPIDEMIA TYPE: ICD-10-CM

## 2025-06-18 DIAGNOSIS — Z00.00 WELCOME TO MEDICARE PREVENTIVE VISIT: Primary | ICD-10-CM

## 2025-06-18 DIAGNOSIS — H33.20 RETINAL DETACHMENT, UNSPECIFIED LATERALITY: ICD-10-CM

## 2025-06-18 DIAGNOSIS — Q79.60 EHLERS-DANLOS SYNDROME: ICD-10-CM

## 2025-06-18 DIAGNOSIS — Z23 NEED FOR PNEUMOCOCCAL 20-VALENT CONJUGATE VACCINATION: ICD-10-CM

## 2025-06-18 PROCEDURE — 90677 PCV20 VACCINE IM: CPT | Performed by: NURSE PRACTITIONER

## 2025-06-18 PROCEDURE — 3017F COLORECTAL CA SCREEN DOC REV: CPT | Performed by: NURSE PRACTITIONER

## 2025-06-18 PROCEDURE — 1123F ACP DISCUSS/DSCN MKR DOCD: CPT | Performed by: NURSE PRACTITIONER

## 2025-06-18 PROCEDURE — G0009 ADMIN PNEUMOCOCCAL VACCINE: HCPCS | Performed by: NURSE PRACTITIONER

## 2025-06-18 PROCEDURE — G0402 INITIAL PREVENTIVE EXAM: HCPCS | Performed by: NURSE PRACTITIONER

## 2025-06-18 SDOH — ECONOMIC STABILITY: FOOD INSECURITY: WITHIN THE PAST 12 MONTHS, YOU WORRIED THAT YOUR FOOD WOULD RUN OUT BEFORE YOU GOT MONEY TO BUY MORE.: NEVER TRUE

## 2025-06-18 SDOH — ECONOMIC STABILITY: FOOD INSECURITY: WITHIN THE PAST 12 MONTHS, THE FOOD YOU BOUGHT JUST DIDN'T LAST AND YOU DIDN'T HAVE MONEY TO GET MORE.: NEVER TRUE

## 2025-06-18 ASSESSMENT — LIFESTYLE VARIABLES
HOW MANY STANDARD DRINKS CONTAINING ALCOHOL DO YOU HAVE ON A TYPICAL DAY: PATIENT DOES NOT DRINK
HOW OFTEN DO YOU HAVE A DRINK CONTAINING ALCOHOL: NEVER

## 2025-06-18 ASSESSMENT — PATIENT HEALTH QUESTIONNAIRE - PHQ9
1. LITTLE INTEREST OR PLEASURE IN DOING THINGS: NOT AT ALL
SUM OF ALL RESPONSES TO PHQ QUESTIONS 1-9: 0
2. FEELING DOWN, DEPRESSED OR HOPELESS: NOT AT ALL

## 2025-06-18 NOTE — PROGRESS NOTES
Medicare Annual Wellness Visit    Verna Fajardo is here for Medicare AWV    Assessment & Plan   Welcome to Medicare preventive visit  Retinal detachment, unspecified laterality  Hyperlipidemia, unspecified hyperlipidemia type  Yung-Danlos syndrome  Need for pneumococcal 20-valent conjugate vaccination  -     Pneumococcal, PCV20, PREVNAR 20, (age 6w+), IM, PF       No follow-ups on file.     Subjective   The following acute and/or chronic problems were also addressed today:  Pt reports retinal detachment this past year  1) left shoulder arm pain  cheronic issue  pain radiates from lower neck posterior shoulder down arm.  Last had mri in 2016  2) pt had [facility] . Does not have back yet  Patient's complete Health Risk Assessment and screening values have been reviewed and are found in Flowsheets. The following problems were reviewed today and where indicated follow up appointments were made and/or referrals ordered.    No Positive Risk Factors identified today.                                    Objective   Vitals:    06/18/25 1308   BP: 110/62   BP Site: Left Upper Arm   Patient Position: Sitting   BP Cuff Size: Medium Adult   Pulse: 87   Resp: 16   Temp: 98.2 °F (36.8 °C)   TempSrc: Temporal   SpO2: 99%   Weight: 48.2 kg (106 lb 3.2 oz)   Height: 1.473 m (4' 9.99\")      Body mass index is 22.2 kg/m².      General Appearance: alert and oriented to person, place and time, well developed and well- nourished, in no acute distress  Skin: warm and dry, no rash or erythema  Head: normocephalic and atraumatic  Eyes: pupils equal, round, and reactive to light, extraocular eye movements intact, conjunctivae normal  ENT: tympanic membrane, external ear and ear canal normal bilaterally, nose without deformity, nasal mucosa and turbinates normal without polyps  Neck: supple and non-tender without mass, no thyromegaly or thyroid nodules, no cervical lymphadenopathy  Pulmonary/Chest: clear to auscultation bilaterally-

## 2025-06-18 NOTE — PROGRESS NOTES
Immunizations Administered       Name Date Dose Route    Pneumococcal, PCV20, PREVNAR 20, (age 6w+), IM, 0.5mL 6/18/2025 0.5 mL Intramuscular    Site: Deltoid- Left    Lot: [id number]    NDC: 0653-2355-07            VIS GIVEN.  CONSENT SIGNED  PATIENT TOLERATED WELL.

## 2025-07-10 ENCOUNTER — HOSPITAL ENCOUNTER (OUTPATIENT)
Dept: MRI IMAGING | Age: 65
Discharge: HOME | End: 2025-07-10
Payer: MEDICARE

## 2025-07-10 DIAGNOSIS — M54.12 CERVICAL RADICULOPATHY: ICD-10-CM

## 2025-07-10 PROCEDURE — 72141 MRI NECK SPINE W/O DYE: CPT

## 2025-08-25 ENCOUNTER — CLINICAL SUPPORT (OUTPATIENT)
Dept: FAMILY MEDICINE CLINIC | Age: 65
End: 2025-08-25
Payer: MEDICARE

## 2025-08-25 DIAGNOSIS — R31.9 HEMATURIA, UNSPECIFIED TYPE: Primary | ICD-10-CM

## 2025-08-25 DIAGNOSIS — M54.50 ACUTE LOW BACK PAIN, UNSPECIFIED BACK PAIN LATERALITY, UNSPECIFIED WHETHER SCIATICA PRESENT: ICD-10-CM

## 2025-08-25 DIAGNOSIS — R30.0 DYSURIA: ICD-10-CM

## 2025-08-25 DIAGNOSIS — N39.0 URINARY TRACT INFECTION WITH HEMATURIA, SITE UNSPECIFIED: ICD-10-CM

## 2025-08-25 DIAGNOSIS — R10.2 PELVIC PRESSURE IN FEMALE: ICD-10-CM

## 2025-08-25 DIAGNOSIS — R31.9 URINARY TRACT INFECTION WITH HEMATURIA, SITE UNSPECIFIED: ICD-10-CM

## 2025-08-25 DIAGNOSIS — R10.30 LOWER ABDOMINAL PAIN: ICD-10-CM

## 2025-08-25 LAB
BILIRUBIN, URINE: NEGATIVE
BLOOD URINE, POC: ABNORMAL
CHARACTER, URINE: ABNORMAL
COLOR, UA: ABNORMAL
GLUCOSE URINE: NEGATIVE MG/DL
KETONES, URINE: ABNORMAL
LEUKOCYTE CLUMPS, URINE: ABNORMAL
NITRITE, URINE: NEGATIVE
PH, URINE: 7.5 (ref 5–9)
PROTEIN, URINE: 100 MG/DL
SPECIFIC GRAVITY UA: 1.02 (ref 1–1.03)
UROBILINOGEN, URINE: 0.2 EU/DL (ref 0–1)

## 2025-08-25 PROCEDURE — 81003 URINALYSIS AUTO W/O SCOPE: CPT | Performed by: NURSE PRACTITIONER

## 2025-08-25 PROCEDURE — 99211 OFF/OP EST MAY X REQ PHY/QHP: CPT | Performed by: NURSE PRACTITIONER

## 2025-08-25 RX ORDER — SULFAMETHOXAZOLE AND TRIMETHOPRIM 800; 160 MG/1; MG/1
1 TABLET ORAL 2 TIMES DAILY
Qty: 20 TABLET | Refills: 0 | Status: SHIPPED | OUTPATIENT
Start: 2025-08-25 | End: 2025-09-04

## 2025-08-27 LAB
BACTERIA UR CULT: ABNORMAL
ORGANISM: ABNORMAL